# Patient Record
Sex: MALE | Race: WHITE | NOT HISPANIC OR LATINO | Employment: OTHER | URBAN - METROPOLITAN AREA
[De-identification: names, ages, dates, MRNs, and addresses within clinical notes are randomized per-mention and may not be internally consistent; named-entity substitution may affect disease eponyms.]

---

## 2023-02-28 PROBLEM — S32.599A FRACTURE OF MULTIPLE PUBIC RAMI: Status: ACTIVE | Noted: 2023-02-28
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
## 2023-02-28 PROBLEM — S52.202A FRACTURE OF LEFT RADIUS AND ULNA: Status: ACTIVE | Noted: 2023-02-28

## 2023-02-28 PROBLEM — T14.8XXA HEMATOMA: Status: ACTIVE | Noted: 2023-02-28

## 2023-02-28 PROBLEM — D72.829 LEUKOCYTOSIS: Status: ACTIVE | Noted: 2023-02-28

## 2023-02-28 PROBLEM — S32.10XA CLOSED FRACTURE OF SACRUM: Status: ACTIVE | Noted: 2023-02-28

## 2023-02-28 PROBLEM — E87.6 HYPOKALEMIA: Status: ACTIVE | Noted: 2023-02-28

## 2023-02-28 PROBLEM — R79.89 ELEVATED LACTIC ACID LEVEL: Status: ACTIVE | Noted: 2023-02-28

## 2023-02-28 PROBLEM — S52.92XA FRACTURE OF LEFT RADIUS AND ULNA: Status: ACTIVE | Noted: 2023-02-28
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
## 2023-03-01 PROBLEM — S32.811A MULTIPLE CLOSED FRACTURES OF PELVIS WITH UNSTABLE DISRUPTION OF PELVIC CIRCLE: Status: ACTIVE | Noted: 2023-03-01

## 2023-03-01 PROBLEM — D72.829 LEUKOCYTOSIS: Status: RESOLVED | Noted: 2023-02-28 | Resolved: 2023-03-01

## 2023-03-02 PROBLEM — G56.12: Status: ACTIVE | Noted: 2023-03-02

## 2023-03-02 PROBLEM — R79.89 ELEVATED SERUM CREATININE: Status: RESOLVED | Noted: 2023-03-02 | Resolved: 2023-03-02

## 2023-03-02 PROBLEM — E87.1 HYPONATREMIA: Status: ACTIVE | Noted: 2023-03-02

## 2023-03-02 PROBLEM — D75.1 ERYTHROCYTOSIS: Status: ACTIVE | Noted: 2023-03-02

## 2023-03-02 PROBLEM — E87.20 LACTIC ACIDOSIS: Status: RESOLVED | Noted: 2023-02-28 | Resolved: 2023-03-02
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
## 2023-03-02 PROBLEM — S32.592A CLOSED FRACTURE OF LEFT INFERIOR PUBIC RAMUS: Status: ACTIVE | Noted: 2023-03-02

## 2023-03-02 PROBLEM — E87.6 HYPOKALEMIA: Status: RESOLVED | Noted: 2023-02-28 | Resolved: 2023-03-02

## 2023-03-02 PROBLEM — N50.1 PELVIC HEMATOMA, MALE: Status: ACTIVE | Noted: 2023-02-28

## 2023-03-02 PROBLEM — N50.1 PELVIC HEMATOMA IN MALE: Status: ACTIVE | Noted: 2023-03-02

## 2023-03-02 PROBLEM — S52.612A DISPLACED FRACTURE OF LEFT ULNA STYLOID PROCESS, INITIAL ENCOUNTER FOR CLOSED FRACTURE: Status: ACTIVE | Noted: 2023-03-02

## 2023-03-02 PROBLEM — S32.121A CLOSED MINIMALLY DISPLACED ZONE II FRACTURE OF SACRUM: Status: ACTIVE | Noted: 2023-02-28

## 2023-03-02 PROBLEM — S61.512A LACERATION OF LEFT WRIST: Status: ACTIVE | Noted: 2023-03-02

## 2023-03-02 PROBLEM — D75.1 ERYTHROCYTOSIS: Status: RESOLVED | Noted: 2023-03-02 | Resolved: 2023-03-02

## 2023-03-02 PROBLEM — M25.532 ACUTE PAIN OF LEFT WRIST: Status: ACTIVE | Noted: 2023-03-02

## 2023-03-02 PROBLEM — S32.512A: Status: ACTIVE | Noted: 2023-02-28

## 2023-03-02 PROBLEM — R79.89 ELEVATED LACTIC ACID LEVEL: Status: RESOLVED | Noted: 2023-02-28 | Resolved: 2023-03-02

## 2023-03-02 PROBLEM — R79.89 ELEVATED SERUM CREATININE: Status: ACTIVE | Noted: 2023-03-02

## 2023-03-02 PROBLEM — S52.502B OPEN FRACTURE OF LEFT DISTAL RADIUS: Status: ACTIVE | Noted: 2023-02-28

## 2023-03-02 PROBLEM — K42.9 UMBILICAL HERNIA WITHOUT OBSTRUCTION AND WITHOUT GANGRENE: Status: ACTIVE | Noted: 2023-03-02

## 2023-03-02 PROBLEM — M25.552 ACUTE PAIN OF LEFT HIP: Status: ACTIVE | Noted: 2023-03-02

## 2023-03-02 PROBLEM — E83.39 HYPOPHOSPHATEMIA: Status: ACTIVE | Noted: 2023-03-02

## 2023-03-02 PROBLEM — E83.39 HYPOPHOSPHATEMIA: Status: RESOLVED | Noted: 2023-03-02 | Resolved: 2023-03-02

## 2023-03-02 PROBLEM — E66.3 OVERWEIGHT (BMI 25.0-29.9): Status: ACTIVE | Noted: 2023-03-02

## 2023-03-03 PROBLEM — D62 ACUTE BLOOD LOSS ANEMIA: Status: ACTIVE | Noted: 2023-03-03

## 2023-03-04 PROBLEM — M25.552 ACUTE PAIN OF LEFT HIP: Status: RESOLVED | Noted: 2023-03-02 | Resolved: 2023-03-04

## 2023-03-04 PROBLEM — S52.612A DISPLACED FRACTURE OF LEFT ULNA STYLOID PROCESS, INITIAL ENCOUNTER FOR CLOSED FRACTURE: Status: RESOLVED | Noted: 2023-03-02 | Resolved: 2023-03-04

## 2023-03-04 PROBLEM — S32.592A CLOSED FRACTURE OF LEFT INFERIOR PUBIC RAMUS: Status: RESOLVED | Noted: 2023-03-02 | Resolved: 2023-03-04

## 2023-03-04 PROBLEM — S32.811A MULTIPLE CLOSED FRACTURES OF PELVIS WITH UNSTABLE DISRUPTION OF PELVIC CIRCLE: Status: RESOLVED | Noted: 2023-03-01 | Resolved: 2023-03-04

## 2023-03-04 PROBLEM — S32.512A: Status: RESOLVED | Noted: 2023-02-28 | Resolved: 2023-03-04

## 2023-03-04 PROBLEM — M25.532 ACUTE PAIN OF LEFT WRIST: Status: RESOLVED | Noted: 2023-03-02 | Resolved: 2023-03-04

## 2023-03-04 PROBLEM — E87.1 HYPONATREMIA: Status: RESOLVED | Noted: 2023-03-02 | Resolved: 2023-03-04

## 2023-03-04 PROBLEM — S32.121A CLOSED MINIMALLY DISPLACED ZONE II FRACTURE OF SACRUM: Status: RESOLVED | Noted: 2023-02-28 | Resolved: 2023-03-04

## 2023-03-04 PROBLEM — S52.502B OPEN FRACTURE OF LEFT DISTAL RADIUS: Status: RESOLVED | Noted: 2023-02-28 | Resolved: 2023-03-04

## 2023-05-03 PROBLEM — M86.159 ACUTE OSTEOMYELITIS OF PELVIC REGION: Status: ACTIVE | Noted: 2023-05-03

## 2023-05-03 PROBLEM — M86.9 OSTEOMYELITIS, PELVIS: Status: ACTIVE | Noted: 2023-05-03

## 2023-05-03 PROBLEM — T81.40XA POSTOPERATIVE INFECTION: Status: ACTIVE | Noted: 2023-05-03

## 2023-05-11 PROBLEM — T81.49XA LEFT HIP POSTOPERATIVE WOUND INFECTION: Status: ACTIVE | Noted: 2023-05-11

## 2023-05-12 ENCOUNTER — PATIENT OUTREACH (OUTPATIENT)
Dept: ADMINISTRATIVE | Facility: CLINIC | Age: 38
End: 2023-05-12

## 2023-05-12 NOTE — PROGRESS NOTES
C3 nurse completed T[facility] Post Discharge Call with Estevan Horner. Pt states he has a sore throat but he thinks it is just from sleeping with his mouth open and verifies that he has Dr. Bird's clinic number for worsening or continued symptoms. OOC number provided. Pt has a follow up with Nicholas Bird MD (Orthopedic Surgery) on 5/23/2023 at 0930. No PCP listed in Epic, unable to route to PCP.